# Patient Record
Sex: MALE | Race: WHITE
[De-identification: names, ages, dates, MRNs, and addresses within clinical notes are randomized per-mention and may not be internally consistent; named-entity substitution may affect disease eponyms.]

---

## 2019-08-22 ENCOUNTER — HOSPITAL ENCOUNTER (EMERGENCY)
Dept: HOSPITAL 6 - ED | Age: 24
LOS: 1 days | Discharge: HOME | End: 2019-08-23
Payer: COMMERCIAL

## 2019-08-22 VITALS — WEIGHT: 175.27 LBS | HEIGHT: 77.01 IN | BODY MASS INDEX: 20.69 KG/M2

## 2019-08-22 DIAGNOSIS — R19.7: ICD-10-CM

## 2019-08-22 DIAGNOSIS — E86.1: ICD-10-CM

## 2019-08-22 DIAGNOSIS — R11.2: ICD-10-CM

## 2019-08-22 DIAGNOSIS — Z90.49: ICD-10-CM

## 2019-08-22 DIAGNOSIS — E87.6: Primary | ICD-10-CM

## 2019-08-22 DIAGNOSIS — F32.9: ICD-10-CM

## 2019-08-22 DIAGNOSIS — R55: ICD-10-CM

## 2019-08-22 LAB
ALBUMIN SERPL-MCNC: 4.3 G/DL (ref 3.5–5)
ALT SERPL-CCNC: 58 U/L (ref 0–55)
APPEARANCE UR: (no result)
AST SERPL-CCNC: 78 U/L (ref 5–34)
BILIRUB SERPL-MCNC: 2.8 MG/DL (ref 0.2–1.2)
BILIRUB UR QL STRIP.AUTO: (no result)
CALCIUM SERPL-MCNC: 8.6 MG/DL (ref 8.3–10.5)
CALCIUM SERPL-MCNC: 9.9 MG/DL (ref 8.3–10.5)
CO2 SERPL-SCNC: 29 MMOL/L (ref 22–29)
CO2 SERPL-SCNC: 31 MMOL/L (ref 22–29)
COLOR UR AUTO: (no result)
ERYTHROCYTE [DISTWIDTH] IN BLOOD BY AUTOMATED COUNT: 11.8 % (ref 11.5–14.5)
ETHANOL SERPL-MCNC: < 10 MG/DL (ref ?–10)
GLUCOSE SERPL-MCNC: 145 MG/DL (ref 75–110)
GLUCOSE SERPL-MCNC: 163 MG/DL (ref 75–110)
GLUCOSE UR QL STRIP.AUTO: NEGATIVE MG/DL
HCT VFR BLD AUTO: 44.2 % (ref 42–52)
HGB BLD-MCNC: 16.2 G/DL (ref 13.5–18)
KETONES UR QL STRIP.AUTO: (no result)
LEUKOCYTE ESTERASE UR QL STRIP: NEGATIVE
LYMPHOCYTES NFR BLD MANUAL: 14 % (ref 20–51)
MCH RBC QN AUTO: 32 PG (ref 27–31)
MCHC RBC AUTO-ENTMCNC: 37 G/DL (ref 33–37)
MCV RBC AUTO: 88 FL (ref 78–100)
MONOCYTES NFR BLD: 11 % (ref 3–10)
MUCOUS THREADS URNS QL MICRO: PRESENT
NEUTS BAND NFR BLD: 1 % (ref 0–10)
NEUTS SEG NFR BLD MANUAL: 74 % (ref 42–75)
NITRITE UR QL STRIP: NEGATIVE
PH UR STRIP.AUTO: 5 [PH] (ref 5–8)
PLATELET # BLD AUTO: 119 K/MM3 (ref 130–400)
PMV BLD AUTO: 10.3 FL (ref 7.4–10.4)
POTASSIUM SERPL-SCNC: 2.6 MMOL/L (ref 3.5–5.1)
POTASSIUM SERPL-SCNC: 2.8 MMOL/L (ref 3.5–5.1)
PROT ?TM UR-MCNC: (no result) MG/DL
PROT SERPL-MCNC: 7.4 G/DL (ref 6.4–8.3)
RBC # BLD AUTO: 5.02 M/MM3 (ref 4.2–5.6)
RBC UR QL AUTO: (no result)
SODIUM SERPL-SCNC: 131 MMOL/L (ref 136–145)
SODIUM SERPL-SCNC: 134 MMOL/L (ref 136–145)
SP GR UR STRIP.AUTO: 1.03 (ref 1–1.03)
UROBILINOGEN UR-MCNC: NORMAL MG/DL
WBC # BLD AUTO: 11.7 K/MM3 (ref 4.8–10.8)
WBC #/AREA URNS HPF: (no result) /HPF (ref 0–3)

## 2019-08-23 VITALS — DIASTOLIC BLOOD PRESSURE: 95 MMHG | SYSTOLIC BLOOD PRESSURE: 143 MMHG

## 2019-09-04 ENCOUNTER — HOSPITAL ENCOUNTER (EMERGENCY)
Dept: HOSPITAL 6 - ED | Age: 24
LOS: 1 days | Discharge: ANOTHER HOSPITAL | End: 2019-09-05
Payer: COMMERCIAL

## 2019-09-04 DIAGNOSIS — R56.9: Primary | ICD-10-CM

## 2019-09-04 DIAGNOSIS — E87.6: ICD-10-CM

## 2019-09-04 DIAGNOSIS — Z88.0: ICD-10-CM

## 2019-09-04 DIAGNOSIS — Z90.49: ICD-10-CM

## 2019-09-04 LAB
ALBUMIN SERPL-MCNC: 3.9 G/DL (ref 3.5–5)
ALT SERPL-CCNC: 119 U/L (ref 0–55)
APPEARANCE UR: CLEAR
AST SERPL-CCNC: 94 U/L (ref 5–34)
BASOPHILS # BLD: 0 10*3/UL (ref 0.02–0.1)
BILIRUB SERPL-MCNC: 1.4 MG/DL (ref 0.2–1.2)
BILIRUB UR QL STRIP.AUTO: NEGATIVE
CALCIUM SERPL-MCNC: 9.3 MG/DL (ref 8.3–10.5)
CO2 SERPL-SCNC: 33 MMOL/L (ref 22–29)
COLOR UR AUTO: YELLOW
EOSINOPHIL # BLD: 0 10*3/UL (ref 0.04–0.4)
EOSINOPHIL NFR BLD: 0.1 % (ref 0–4)
ERYTHROCYTE [DISTWIDTH] IN BLOOD BY AUTOMATED COUNT: 12.1 % (ref 11.5–14.5)
ETHANOL SERPL-MCNC: < 10 MG/DL (ref ?–10)
GLUCOSE SERPL-MCNC: 99 MG/DL (ref 75–110)
GLUCOSE UR QL STRIP.AUTO: NEGATIVE MG/DL
HCT VFR BLD AUTO: 44.1 % (ref 42–52)
HGB BLD-MCNC: 15.5 G/DL (ref 13.5–18)
KETONES UR QL STRIP.AUTO: (no result)
LEUKOCYTE ESTERASE UR QL STRIP: NEGATIVE
LYMPHOCYTES # BLD: 1.2 10*3/UL (ref 1.5–4)
MCH RBC QN AUTO: 32 PG (ref 27–31)
MCHC RBC AUTO-ENTMCNC: 35 G/DL (ref 33–37)
MCV RBC AUTO: 90 FL (ref 78–100)
MONOCYTES # BLD: 0.5 10*3/UL (ref 0.2–0.8)
MUCOUS THREADS URNS QL MICRO: PRESENT
NEUTROPHILS # BLD: 5.7 10*3/UL (ref 1.4–6.5)
NITRITE UR QL STRIP: NEGATIVE
PH UR STRIP.AUTO: 7.5 [PH] (ref 5–8)
PLATELET # BLD AUTO: 158 K/MM3 (ref 130–400)
PMV BLD AUTO: 10.5 FL (ref 7.4–10.4)
POTASSIUM SERPL-SCNC: 2.7 MMOL/L (ref 3.5–5.1)
PROT ?TM UR-MCNC: (no result) MG/DL
PROT SERPL-MCNC: 6.7 G/DL (ref 6.4–8.3)
RBC # BLD AUTO: 4.92 M/MM3 (ref 4.2–5.6)
RBC UR QL AUTO: NEGATIVE
SODIUM SERPL-SCNC: 134 MMOL/L (ref 136–145)
SP GR UR STRIP.AUTO: 1.03 (ref 1–1.03)
UROBILINOGEN UR-MCNC: NORMAL MG/DL
WBC # BLD AUTO: 7.5 K/MM3 (ref 4.8–10.8)
WBC #/AREA URNS HPF: (no result) /HPF (ref 0–3)

## 2019-09-05 VITALS — DIASTOLIC BLOOD PRESSURE: 82 MMHG | SYSTOLIC BLOOD PRESSURE: 131 MMHG

## 2019-11-18 ENCOUNTER — HOSPITAL ENCOUNTER (EMERGENCY)
Dept: HOSPITAL 6 - ED | Age: 24
Discharge: HOME | End: 2019-11-18
Payer: COMMERCIAL

## 2019-11-18 VITALS — BODY MASS INDEX: 21.29 KG/M2 | WEIGHT: 180.34 LBS | HEIGHT: 77.01 IN

## 2019-11-18 VITALS — DIASTOLIC BLOOD PRESSURE: 91 MMHG | SYSTOLIC BLOOD PRESSURE: 148 MMHG

## 2019-11-18 DIAGNOSIS — G40.909: Primary | ICD-10-CM

## 2019-11-18 DIAGNOSIS — Z88.1: ICD-10-CM

## 2020-04-23 ENCOUNTER — HOSPITAL ENCOUNTER (EMERGENCY)
Dept: HOSPITAL 6 - ED | Age: 25
LOS: 1 days | Discharge: ANOTHER HOSPITAL | End: 2020-04-24
Payer: COMMERCIAL

## 2020-04-23 DIAGNOSIS — R10.13: ICD-10-CM

## 2020-04-23 DIAGNOSIS — F10.239: Primary | ICD-10-CM

## 2020-04-23 DIAGNOSIS — F19.239: ICD-10-CM

## 2020-04-23 DIAGNOSIS — Z90.89: ICD-10-CM

## 2020-04-23 DIAGNOSIS — F32.9: ICD-10-CM

## 2020-04-23 DIAGNOSIS — Z88.0: ICD-10-CM

## 2020-04-24 ENCOUNTER — HOSPITAL ENCOUNTER (INPATIENT)
Dept: HOSPITAL 19 - MEDICAL | Age: 25
LOS: 1 days | Discharge: HOME | DRG: 897 | End: 2020-04-25
Attending: HOSPITALIST | Admitting: HOSPITALIST
Payer: COMMERCIAL

## 2020-04-24 VITALS — DIASTOLIC BLOOD PRESSURE: 77 MMHG | SYSTOLIC BLOOD PRESSURE: 137 MMHG | HEART RATE: 87 BPM | TEMPERATURE: 97.9 F

## 2020-04-24 VITALS — TEMPERATURE: 98 F | DIASTOLIC BLOOD PRESSURE: 82 MMHG | SYSTOLIC BLOOD PRESSURE: 137 MMHG | HEART RATE: 66 BPM

## 2020-04-24 VITALS — HEART RATE: 66 BPM | SYSTOLIC BLOOD PRESSURE: 136 MMHG | TEMPERATURE: 97.7 F | DIASTOLIC BLOOD PRESSURE: 87 MMHG

## 2020-04-24 VITALS — DIASTOLIC BLOOD PRESSURE: 76 MMHG | HEART RATE: 63 BPM | TEMPERATURE: 97.7 F | SYSTOLIC BLOOD PRESSURE: 138 MMHG

## 2020-04-24 VITALS — DIASTOLIC BLOOD PRESSURE: 72 MMHG | HEART RATE: 65 BPM | TEMPERATURE: 98.2 F | SYSTOLIC BLOOD PRESSURE: 132 MMHG

## 2020-04-24 VITALS — HEART RATE: 82 BPM | DIASTOLIC BLOOD PRESSURE: 83 MMHG | TEMPERATURE: 98.9 F | SYSTOLIC BLOOD PRESSURE: 136 MMHG

## 2020-04-24 VITALS — TEMPERATURE: 98.3 F | DIASTOLIC BLOOD PRESSURE: 73 MMHG | SYSTOLIC BLOOD PRESSURE: 136 MMHG | HEART RATE: 69 BPM

## 2020-04-24 VITALS — SYSTOLIC BLOOD PRESSURE: 126 MMHG | TEMPERATURE: 98.6 F | DIASTOLIC BLOOD PRESSURE: 71 MMHG | HEART RATE: 69 BPM

## 2020-04-24 VITALS — WEIGHT: 190.7 LBS | BODY MASS INDEX: 22.52 KG/M2 | HEIGHT: 77.01 IN

## 2020-04-24 VITALS — SYSTOLIC BLOOD PRESSURE: 125 MMHG | DIASTOLIC BLOOD PRESSURE: 80 MMHG

## 2020-04-24 VITALS — SYSTOLIC BLOOD PRESSURE: 126 MMHG | HEART RATE: 77 BPM | DIASTOLIC BLOOD PRESSURE: 78 MMHG | TEMPERATURE: 97.4 F

## 2020-04-24 DIAGNOSIS — E83.42: ICD-10-CM

## 2020-04-24 DIAGNOSIS — R73.9: ICD-10-CM

## 2020-04-24 DIAGNOSIS — Z88.0: ICD-10-CM

## 2020-04-24 DIAGNOSIS — F32.9: ICD-10-CM

## 2020-04-24 DIAGNOSIS — F10.239: Primary | ICD-10-CM

## 2020-04-24 DIAGNOSIS — E87.3: ICD-10-CM

## 2020-04-24 DIAGNOSIS — F41.9: ICD-10-CM

## 2020-04-24 DIAGNOSIS — E87.6: ICD-10-CM

## 2020-04-24 LAB
ALBUMIN SERPL-MCNC: 4.1 GM/DL (ref 3.5–5)
ALBUMIN SERPL-MCNC: 5.2 G/DL (ref 3.5–5)
ALP SERPL-CCNC: 65 U/L (ref 50–136)
ALT SERPL-CCNC: 70 U/L (ref 4–49)
ALT SERPL-CCNC: 93 U/L (ref 0–55)
ANION GAP SERPL CALC-SCNC: 8 MMOL/L (ref 7–16)
APPEARANCE UR: (no result)
APTT PPP: 19.8 SECONDS (ref 21–32)
AST SERPL-CCNC: 54 U/L (ref 15–37)
AST SERPL-CCNC: 59 U/L (ref 5–34)
BASOPHILS # BLD: 0 10*3/UL (ref 0.02–0.1)
BASOPHILS # BLD: 0 10*3/UL (ref 0–0.2)
BASOPHILS NFR BLD AUTO: 0.5 % (ref 0–2)
BILIRUB DIRECT SERPL-MCNC: 0.7 MG/DL (ref 0–0.5)
BILIRUB SERPL-MCNC: 1.6 MG/DL (ref 0.2–1.2)
BILIRUB SERPL-MCNC: 1.6 MG/DL (ref 0.2–1.2)
BILIRUB SERPL-MCNC: 1.8 MG/DL (ref 0–1)
BILIRUB UR QL STRIP.AUTO: (no result)
BUN SERPL-MCNC: 20 MG/DL (ref 9–20)
CALCIUM SERPL-MCNC: 10.7 MG/DL (ref 8.3–10.5)
CALCIUM SERPL-MCNC: 9.2 MG/DL (ref 8.4–10.2)
CHLORIDE SERPL-SCNC: 95 MMOL/L (ref 98–107)
CO2 SERPL-SCNC: 32 MMOL/L (ref 22–29)
CO2 SERPL-SCNC: 33 MMOL/L (ref 22–30)
COLOR UR AUTO: (no result)
CREAT SERPL-SCNC: 0.73 UMOL/L (ref 0.66–1.25)
EOSINOPHIL # BLD: 0 10*3/UL (ref 0.04–0.4)
EOSINOPHIL # BLD: 0.1 10*3/UL (ref 0–0.7)
EOSINOPHIL NFR BLD: 0 % (ref 0–4)
EOSINOPHIL NFR BLD: 1.9 % (ref 0–4)
ERYTHROCYTE [DISTWIDTH] IN BLOOD BY AUTOMATED COUNT: 11.9 % (ref 11.5–14.5)
ERYTHROCYTE [DISTWIDTH] IN BLOOD BY AUTOMATED COUNT: 12.7 % (ref 11.5–14.5)
ETHANOL SERPL-MCNC: < 10 MG/DL (ref ?–10)
GLUCOSE SERPL-MCNC: 112 MG/DL (ref 74–106)
GLUCOSE SERPL-MCNC: 163 MG/DL (ref 75–110)
GLUCOSE UR QL STRIP.AUTO: NEGATIVE MG/DL
GRANULOCYTES # BLD AUTO: 61.2 % (ref 42.2–75.2)
HCT VFR BLD AUTO: 39.1 % (ref 42–52)
HCT VFR BLD AUTO: 47.4 % (ref 42–52)
HGB BLD-MCNC: 13.4 G/DL (ref 13.5–18)
HGB BLD-MCNC: 16.2 G/DL (ref 13.5–18)
KETONES UR QL STRIP.AUTO: (no result)
LEUKOCYTE ESTERASE UR QL STRIP: NEGATIVE
LIPASE SERPL-CCNC: 6 U/L (ref 8–78)
LYMPHOCYTES # BLD: 1.8 10*3/UL (ref 1.5–4)
LYMPHOCYTES # BLD: 2 10*3/UL (ref 1.2–3.4)
LYMPHOCYTES NFR BLD: 26.2 % (ref 20–51)
MAGNESIUM SERPL-MCNC: 1.59 MG/DL (ref 1.6–2.6)
MAGNESIUM SERPL-MCNC: 1.7 MG/DL (ref 1.6–2.3)
MCH RBC QN AUTO: 33 PG (ref 27–31)
MCH RBC QN AUTO: 33 PG (ref 27–31)
MCHC RBC AUTO-ENTMCNC: 34 G/DL (ref 33–37)
MCHC RBC AUTO-ENTMCNC: 34 G/DL (ref 33–37)
MCV RBC AUTO: 95 FL (ref 78–100)
MCV RBC AUTO: 95 FL (ref 80–100)
MONOCYTES # BLD: 0.8 10*3/UL (ref 0.1–0.6)
MONOCYTES # BLD: 1.3 10*3/UL (ref 0.2–0.8)
MONOCYTES NFR BLD AUTO: 10.1 % (ref 1.7–9.3)
MUCOUS THREADS URNS QL MICRO: PRESENT
NEUTROPHILS # BLD: 10.5 10*3/UL (ref 1.4–6.5)
NEUTROPHILS # BLD: 4.6 10*3/UL (ref 1.4–6.5)
NITRITE UR QL STRIP: NEGATIVE
PH UR STRIP.AUTO: 8 [PH] (ref 5–8)
PLATELET # BLD AUTO: 226 K/MM3 (ref 130–400)
PLATELET # BLD AUTO: 349 K/MM3 (ref 130–400)
PMV BLD AUTO: 9.8 FL (ref 7.4–10.4)
PMV BLD AUTO: 9.8 FL (ref 7.4–10.4)
POTASSIUM SERPL-SCNC: 2.9 MMOL/L (ref 3.4–5)
POTASSIUM SERPL-SCNC: 3.4 MMOL/L (ref 3.5–5.1)
PROT ?TM UR-MCNC: (no result) MG/DL
PROT SERPL-MCNC: 6.6 GM/DL (ref 6.4–8.2)
PROT SERPL-MCNC: 8 G/DL (ref 6.4–8.3)
PROTHROMBIN TIME: 10.9 SECONDS (ref 9–12)
RBC # BLD AUTO: 4.1 M/MM3 (ref 4.2–5.6)
RBC # BLD AUTO: 4.98 M/MM3 (ref 4.2–5.6)
RBC UR QL AUTO: NEGATIVE
SODIUM SERPL-SCNC: 136 MMOL/L (ref 137–145)
SODIUM SERPL-SCNC: 141 MMOL/L (ref 136–145)
SP GR UR STRIP.AUTO: 1.04 (ref 1–1.03)
UROBILINOGEN UR-MCNC: NORMAL MG/DL
WBC # BLD AUTO: 13.6 K/MM3 (ref 4.8–10.8)
WBC #/AREA URNS HPF: (no result) /HPF (ref 0–3)

## 2020-04-24 PROCEDURE — C9113 INJ PANTOPRAZOLE SODIUM, VIA: HCPCS

## 2020-04-24 PROCEDURE — A4216 STERILE WATER/SALINE, 10 ML: HCPCS

## 2020-04-24 NOTE — NUR
Pt has scored a zero on every ETOH assessment thus far. He denies
nausea/vomiting and states he is feeling a little better as of right now. he
is aware of his POC. Requested an increase in his diet, will speak with PA to
request. No other needs. Call light is in reach.

## 2020-04-24 NOTE — NUR
Initial shift assessment done- denies pain,nausea- no tremors noted- eating
without nausea--states wants to go home tomorrow, Tele on, VSS-- on detox
protocol- scores 0,, on potassium protocol-will repeat potassium at 2100.

## 2020-04-24 NOTE — NUR
PATIENT RESTING IN BED DURING REPORT GIVEN TO DAY SHIFT NURSE WITH NO CURRENT
C/O OR NEEDS. TELE CONTINUES.

## 2020-04-24 NOTE — NUR
Assessment complete. Patient resting in bed, awakes easily to voice upon
entry. He is aware of his POC. His scores have been zeros this morning. No
visible tremors or active nausea or vomiting. IV site is CD&I, fluids and
magnesium running at this time. He denies pain or discomfort of any kind.
States he is just groggy. Informed him of the high fall risk precautions that
are in place, including the bed alarm. No other needs were expressed at this
time. Call light is in reach.

## 2020-04-24 NOTE — NUR
met with patient to discuss discharge planning. Patient lives
with his wife, Leila (ph#[phone]) and nine month old daughter. Patient sees
SAIGE Beard at [facility] for primary care and obtains
medications from [facility] with no difficulties. Patient is
independent with ADLS. SW addressed alcohol use with patient. Patient denies
any history of inpatient treatment. Patient expressed interest in outpatient
services. SW provided resources which included information for [facility], [facility], and local AA meetings. SW offered to make an
appointment for patient at [facility] for evaluation but patient advised he wants to
look these resources over and follow up on his own. Patient plans to return
home upon discharge. SW to continue to follow.

## 2020-04-24 NOTE — NUR
Pt had no other issues through the day. He has no withdrawl symptoms and
states that he feels better. He ate all of his dinner and requested more food.
Fluids continue to run @125ml/hr. IV site CD&I. No other needs. Call light in
reach.

## 2020-04-24 NOTE — NUR
Pt continues to remain stable with out needs. No visible tremors, pt has had
no nausea or vomiting and his diet has been increased to general. He was
hungry so I provided him with a sandwich box, he has tolerated that well since
consumption. He stated his throat was a little bit sore. While Dr. Martinez was in
she did state that his CT showed some esophogeal inflammation which was likely
from the vomiting he was experiencing prior to admission. I offered tylenol or
possible antistetic throat spray. he stated it is not that bad. I told him to
inform me if it worsens and he feels like he needs something. No other needs
at this time. Call light in reach.

## 2020-04-25 VITALS — SYSTOLIC BLOOD PRESSURE: 123 MMHG | TEMPERATURE: 98 F | DIASTOLIC BLOOD PRESSURE: 82 MMHG | HEART RATE: 53 BPM

## 2020-04-25 VITALS — DIASTOLIC BLOOD PRESSURE: 78 MMHG | SYSTOLIC BLOOD PRESSURE: 122 MMHG | TEMPERATURE: 97.8 F | HEART RATE: 65 BPM

## 2020-04-25 VITALS — HEART RATE: 60 BPM | DIASTOLIC BLOOD PRESSURE: 87 MMHG | SYSTOLIC BLOOD PRESSURE: 134 MMHG | TEMPERATURE: 97.3 F

## 2020-04-25 VITALS — DIASTOLIC BLOOD PRESSURE: 96 MMHG | TEMPERATURE: 98.1 F | HEART RATE: 78 BPM | SYSTOLIC BLOOD PRESSURE: 130 MMHG

## 2020-04-25 VITALS — HEART RATE: 65 BPM | TEMPERATURE: 97.8 F | DIASTOLIC BLOOD PRESSURE: 79 MMHG | SYSTOLIC BLOOD PRESSURE: 123 MMHG

## 2020-04-25 LAB
ALBUMIN SERPL-MCNC: 3.5 GM/DL (ref 3.5–5)
ALP SERPL-CCNC: 54 U/L (ref 50–136)
ALT SERPL-CCNC: 55 U/L (ref 4–49)
ANION GAP SERPL CALC-SCNC: 4 MMOL/L (ref 7–16)
AST SERPL-CCNC: 40 U/L (ref 15–37)
BASOPHILS # BLD: 0 10*3/UL (ref 0–0.2)
BASOPHILS NFR BLD AUTO: 0.5 % (ref 0–2)
BILIRUB SERPL-MCNC: 0.8 MG/DL (ref 0–1)
BUN SERPL-MCNC: 10 MG/DL (ref 9–20)
CALCIUM SERPL-MCNC: 8.8 MG/DL (ref 8.4–10.2)
CHLORIDE SERPL-SCNC: 105 MMOL/L (ref 98–107)
CO2 SERPL-SCNC: 27 MMOL/L (ref 22–30)
CREAT SERPL-SCNC: 0.68 UMOL/L (ref 0.66–1.25)
EOSINOPHIL # BLD: 0.4 10*3/UL (ref 0–0.7)
EOSINOPHIL NFR BLD: 8 % (ref 0–4)
ERYTHROCYTE [DISTWIDTH] IN BLOOD BY AUTOMATED COUNT: 11.9 % (ref 11.5–14.5)
GLUCOSE SERPL-MCNC: 99 MG/DL (ref 74–106)
GRANULOCYTES # BLD AUTO: 44.9 % (ref 42.2–75.2)
HCT VFR BLD AUTO: 38.2 % (ref 42–52)
HGB BLD-MCNC: 12.9 G/DL (ref 13.5–18)
LYMPHOCYTES # BLD: 2.2 10*3/UL (ref 1.2–3.4)
LYMPHOCYTES NFR BLD: 40.7 % (ref 20–51)
MAGNESIUM SERPL-MCNC: 2 MG/DL (ref 1.6–2.3)
MCH RBC QN AUTO: 33 PG (ref 27–31)
MCHC RBC AUTO-ENTMCNC: 34 G/DL (ref 33–37)
MCV RBC AUTO: 98 FL (ref 80–100)
MONOCYTES # BLD: 0.3 10*3/UL (ref 0.1–0.6)
MONOCYTES NFR BLD AUTO: 5.5 % (ref 1.7–9.3)
NEUTROPHILS # BLD: 2.5 10*3/UL (ref 1.4–6.5)
PLATELET # BLD AUTO: 180 K/MM3 (ref 130–400)
PMV BLD AUTO: 9.9 FL (ref 7.4–10.4)
POTASSIUM SERPL-SCNC: 3.8 MMOL/L (ref 3.4–5)
PROT SERPL-MCNC: 5.9 GM/DL (ref 6.4–8.2)
RBC # BLD AUTO: 3.89 M/MM3 (ref 4.2–5.6)
SODIUM SERPL-SCNC: 137 MMOL/L (ref 137–145)

## 2020-04-25 NOTE — NUR
Discharge instructions reviewed with patient, verbalized understanding.
Discharged ambulatory to auto/home at 1120.

## 2020-04-25 NOTE — NUR
Patient alert and oriented, answers questions appropriately.  See assessment.
CIWA score remains zero.  No tremors/hallucinations/slurring of speech.
Patient voices his lapse in alcohol consumption.  No c/o pain or discomfort.

## 2020-05-07 ENCOUNTER — HOSPITAL ENCOUNTER (EMERGENCY)
Dept: HOSPITAL 19 - COL.ER | Age: 25
Discharge: HOME | End: 2020-05-07
Payer: COMMERCIAL

## 2020-05-07 VITALS — HEIGHT: 77.01 IN | BODY MASS INDEX: 23.66 KG/M2 | WEIGHT: 200.4 LBS

## 2020-05-07 VITALS — DIASTOLIC BLOOD PRESSURE: 98 MMHG | HEART RATE: 98 BPM | SYSTOLIC BLOOD PRESSURE: 150 MMHG

## 2020-05-07 VITALS — TEMPERATURE: 97.4 F

## 2020-05-07 DIAGNOSIS — F41.9: ICD-10-CM
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
## 2020-05-07 DIAGNOSIS — F32.9: ICD-10-CM

## 2020-05-07 DIAGNOSIS — F10.229: ICD-10-CM
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
## 2020-05-07 DIAGNOSIS — S49.92XA: Primary | ICD-10-CM

## 2020-05-07 LAB
ALBUMIN SERPL-MCNC: 4.6 GM/DL (ref 3.5–5)
ALP SERPL-CCNC: 93 U/L (ref 50–136)
ALT SERPL-CCNC: 55 U/L (ref 4–49)
ANION GAP SERPL CALC-SCNC: 12 MMOL/L (ref 7–16)
AST SERPL-CCNC: 48 U/L (ref 15–37)
BASOPHILS # BLD: 0 10*3/UL (ref 0–0.2)
BASOPHILS NFR BLD AUTO: 0.7 % (ref 0–2)
BILIRUB SERPL-MCNC: 0.4 MG/DL (ref 0–1)
BUN SERPL-MCNC: 10 MG/DL (ref 9–20)
CALCIUM SERPL-MCNC: 9.7 MG/DL (ref 8.4–10.2)
CHLORIDE SERPL-SCNC: 102 MMOL/L (ref 98–107)
CO2 SERPL-SCNC: 28 MMOL/L (ref 22–30)
CREAT SERPL-SCNC: 0.77 UMOL/L (ref 0.66–1.25)
EOSINOPHIL # BLD: 0 10*3/UL (ref 0–0.7)
EOSINOPHIL NFR BLD: 0.2 % (ref 0–4)
ERYTHROCYTE [DISTWIDTH] IN BLOOD BY AUTOMATED COUNT: 11.3 % (ref 11.5–14.5)
ETHANOL SPEC-SCNC: 293 MG/DL
GLUCOSE SERPL-MCNC: 168 MG/DL (ref 74–106)
GRANULOCYTES # BLD AUTO: 45.7 % (ref 42.2–75.2)
HCT VFR BLD AUTO: 48.2 % (ref 42–52)
HGB BLD-MCNC: 16.2 G/DL (ref 13.5–18)
LIPASE SERPL-CCNC: 66 U/L (ref 23–300)
LYMPHOCYTES # BLD: 2.6 10*3/UL (ref 1.2–3.4)
LYMPHOCYTES NFR BLD: 46.8 % (ref 20–51)
MAGNESIUM SERPL-MCNC: 2 MG/DL (ref 1.6–2.3)
MCH RBC QN AUTO: 32 PG (ref 27–31)
MCHC RBC AUTO-ENTMCNC: 34 G/DL (ref 33–37)
MCV RBC AUTO: 94 FL (ref 80–100)
MONOCYTES # BLD: 0.3 10*3/UL (ref 0.1–0.6)
MONOCYTES NFR BLD AUTO: 6.2 % (ref 1.7–9.3)
NEUTROPHILS # BLD: 2.5 10*3/UL (ref 1.4–6.5)
PHOSPHATE SERPL-MCNC: 3.5 MG/DL (ref 2.5–4.5)
PLATELET # BLD AUTO: 317 K/MM3 (ref 130–400)
PMV BLD AUTO: 8.9 FL (ref 7.4–10.4)
POTASSIUM SERPL-SCNC: 3.8 MMOL/L (ref 3.4–5)
PROT SERPL-MCNC: 7.7 GM/DL (ref 6.4–8.2)
RBC # BLD AUTO: 5.11 M/MM3 (ref 4.2–5.6)
SODIUM SERPL-SCNC: 143 MMOL/L (ref 137–145)

## 2020-05-08 ENCOUNTER — HOSPITAL ENCOUNTER (EMERGENCY)
Dept: HOSPITAL 6 - ED | Age: 25
Discharge: HOME | End: 2020-05-08
Payer: COMMERCIAL

## 2020-05-08 VITALS — SYSTOLIC BLOOD PRESSURE: 148 MMHG | DIASTOLIC BLOOD PRESSURE: 95 MMHG

## 2020-05-08 VITALS — HEIGHT: 77.01 IN | BODY MASS INDEX: 24.26 KG/M2 | WEIGHT: 205.47 LBS

## 2020-05-08 DIAGNOSIS — F10.220: Primary | ICD-10-CM

## 2020-05-08 DIAGNOSIS — M25.512: ICD-10-CM

## 2020-05-08 DIAGNOSIS — F32.9: ICD-10-CM

## 2020-05-08 LAB
ALBUMIN SERPL-MCNC: 4.5 G/DL (ref 3.5–5)
ALT SERPL-CCNC: 57 U/L (ref 0–55)
AST SERPL-CCNC: 45 U/L (ref 5–34)
BASOPHILS # BLD: 0 10*3/UL (ref 0.02–0.1)
BILIRUB SERPL-MCNC: 0.5 MG/DL (ref 0.2–1.2)
CALCIUM SERPL-MCNC: 9.8 MG/DL (ref 8.3–10.5)
CO2 SERPL-SCNC: 26 MMOL/L (ref 22–29)
EOSINOPHIL # BLD: 0 10*3/UL (ref 0.04–0.4)
EOSINOPHIL NFR BLD: 0.2 % (ref 0–4)
ERYTHROCYTE [DISTWIDTH] IN BLOOD BY AUTOMATED COUNT: 11.9 % (ref 11.5–14.5)
ETHANOL SERPL-MCNC: 333 MG/DL (ref ?–10)
GLUCOSE SERPL-MCNC: 116 MG/DL (ref 75–110)
HCT VFR BLD AUTO: 49.2 % (ref 42–52)
HGB BLD-MCNC: 16.4 G/DL (ref 13.5–18)
LYMPHOCYTES # BLD: 2.2 10*3/UL (ref 1.5–4)
MCH RBC QN AUTO: 31 PG (ref 27–31)
MCHC RBC AUTO-ENTMCNC: 33 G/DL (ref 33–37)
MCV RBC AUTO: 93 FL (ref 78–100)
MONOCYTES # BLD: 0.3 10*3/UL (ref 0.2–0.8)
NEUTROPHILS # BLD: 2.3 10*3/UL (ref 1.4–6.5)
PLATELET # BLD AUTO: 315 K/MM3 (ref 130–400)
PMV BLD AUTO: 8.8 FL (ref 7.4–10.4)
POTASSIUM SERPL-SCNC: 3.5 MMOL/L (ref 3.5–5.1)
PROT SERPL-MCNC: 7.7 G/DL (ref 6.4–8.3)
RBC # BLD AUTO: 5.27 M/MM3 (ref 4.2–5.6)
SODIUM SERPL-SCNC: 145 MMOL/L (ref 136–145)
WBC # BLD AUTO: 4.8 K/MM3 (ref 4.8–10.8)

## 2020-05-09 ENCOUNTER — HOSPITAL ENCOUNTER (EMERGENCY)
Dept: HOSPITAL 6 - ED | Age: 25
Discharge: HOME | End: 2020-05-09
Payer: COMMERCIAL

## 2020-05-09 VITALS — HEIGHT: 77.01 IN | WEIGHT: 195.33 LBS | BODY MASS INDEX: 23.06 KG/M2

## 2020-05-09 VITALS — SYSTOLIC BLOOD PRESSURE: 151 MMHG | DIASTOLIC BLOOD PRESSURE: 95 MMHG

## 2020-05-09 DIAGNOSIS — F41.9: ICD-10-CM
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
## 2020-05-09 DIAGNOSIS — S49.92XA: Primary | ICD-10-CM

## 2020-05-09 DIAGNOSIS — F10.220: ICD-10-CM
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
## 2020-05-09 LAB
ETHANOL SERPL-MCNC: 58 MG/DL (ref ?–10)
MAGNESIUM SERPL-MCNC: 1.75 MG/DL (ref 1.6–2.6)

## 2020-08-03 ENCOUNTER — HOSPITAL ENCOUNTER (EMERGENCY)
Dept: HOSPITAL 6 - ED | Age: 25
LOS: 1 days | Discharge: HOME | End: 2020-08-04
Payer: COMMERCIAL

## 2020-08-03 VITALS — WEIGHT: 215.39 LBS | BODY MASS INDEX: 25.43 KG/M2 | HEIGHT: 77.01 IN

## 2020-08-03 DIAGNOSIS — F10.229: Primary | ICD-10-CM

## 2020-08-03 DIAGNOSIS — F32.9: ICD-10-CM

## 2020-08-03 DIAGNOSIS — F41.9: ICD-10-CM

## 2020-08-04 ENCOUNTER — HOSPITAL ENCOUNTER (EMERGENCY)
Dept: HOSPITAL 6 - ED | Age: 25
Discharge: HOME | End: 2020-08-04
Payer: COMMERCIAL

## 2020-08-04 ENCOUNTER — HOSPITAL ENCOUNTER (EMERGENCY)
Dept: HOSPITAL 6 - ED | Age: 25
Discharge: LEFT BEFORE DISCHARGE | End: 2020-08-04
Payer: COMMERCIAL

## 2020-08-04 VITALS — SYSTOLIC BLOOD PRESSURE: 132 MMHG | DIASTOLIC BLOOD PRESSURE: 98 MMHG

## 2020-08-04 VITALS — SYSTOLIC BLOOD PRESSURE: 151 MMHG | DIASTOLIC BLOOD PRESSURE: 92 MMHG

## 2020-08-04 VITALS — SYSTOLIC BLOOD PRESSURE: 151 MMHG | DIASTOLIC BLOOD PRESSURE: 93 MMHG

## 2020-08-04 VITALS — HEIGHT: 77.01 IN | WEIGHT: 201.5 LBS | BODY MASS INDEX: 23.79 KG/M2

## 2020-08-04 DIAGNOSIS — F10.129: Primary | ICD-10-CM

## 2020-08-04 DIAGNOSIS — F41.9: ICD-10-CM

## 2020-08-04 DIAGNOSIS — R69: Primary | ICD-10-CM

## 2020-08-04 DIAGNOSIS — F19.10: ICD-10-CM

## 2020-08-04 DIAGNOSIS — F32.9: ICD-10-CM

## 2020-08-04 LAB
ALBUMIN SERPL-MCNC: 4.8 G/DL (ref 3.5–5)
ALBUMIN SERPL-MCNC: 4.8 G/DL (ref 3.5–5)
ALT SERPL-CCNC: 44 U/L (ref 0–55)
ALT SERPL-CCNC: 69 U/L (ref 0–55)
APAP SERPL-MCNC: < 1 UG/ML
AST SERPL-CCNC: 29 U/L (ref 5–34)
AST SERPL-CCNC: 56 U/L (ref 5–34)
BASOPHILS # BLD: 0 10*3/UL (ref 0.02–0.1)
BASOPHILS # BLD: 0 10*3/UL (ref 0.02–0.1)
BILIRUB SERPL-MCNC: 0.8 MG/DL (ref 0.2–1.2)
BILIRUB SERPL-MCNC: 0.9 MG/DL (ref 0.2–1.2)
CALCIUM SERPL-MCNC: 9.6 MG/DL (ref 8.3–10.5)
CALCIUM SERPL-MCNC: 9.7 MG/DL (ref 8.3–10.5)
CO2 SERPL-SCNC: 24 MMOL/L (ref 22–29)
CO2 SERPL-SCNC: 25 MMOL/L (ref 22–29)
EOSINOPHIL # BLD: 0 10*3/UL (ref 0.04–0.4)
EOSINOPHIL # BLD: 0 10*3/UL (ref 0.04–0.4)
EOSINOPHIL NFR BLD: 0.1 % (ref 0–4)
EOSINOPHIL NFR BLD: 0.1 % (ref 0–4)
ERYTHROCYTE [DISTWIDTH] IN BLOOD BY AUTOMATED COUNT: 13.2 % (ref 11.5–14.5)
ERYTHROCYTE [DISTWIDTH] IN BLOOD BY AUTOMATED COUNT: 13.2 % (ref 11.5–14.5)
ETHANOL SERPL-MCNC: 280 MG/DL (ref ?–10)
ETHANOL SERPL-MCNC: 304 MG/DL (ref ?–10)
GASTROCULT GAST QL: NEGATIVE
GLUCOSE SERPL-MCNC: 131 MG/DL (ref 75–110)
GLUCOSE SERPL-MCNC: 143 MG/DL (ref 75–110)
HCT VFR BLD AUTO: 48.1 % (ref 42–52)
HCT VFR BLD AUTO: 48.8 % (ref 42–52)
HGB BLD-MCNC: 16.2 G/DL (ref 13.5–18)
HGB BLD-MCNC: 16.4 G/DL (ref 13.5–18)
LIPASE SERPL-CCNC: 26 U/L (ref 8–78)
LYMPHOCYTES # BLD: 2.1 10*3/UL (ref 1.5–4)
LYMPHOCYTES # BLD: 2.4 10*3/UL (ref 1.5–4)
MCH RBC QN AUTO: 29 PG (ref 27–31)
MCH RBC QN AUTO: 30 PG (ref 27–31)
MCHC RBC AUTO-ENTMCNC: 33 G/DL (ref 33–37)
MCHC RBC AUTO-ENTMCNC: 34 G/DL (ref 33–37)
MCV RBC AUTO: 87 FL (ref 78–100)
MCV RBC AUTO: 88 FL (ref 78–100)
MONOCYTES # BLD: 0.5 10*3/UL (ref 0.2–0.8)
MONOCYTES # BLD: 0.7 10*3/UL (ref 0.2–0.8)
NEUTROPHILS # BLD: 4.4 10*3/UL (ref 1.4–6.5)
NEUTROPHILS # BLD: 4.6 10*3/UL (ref 1.4–6.5)
PLATELET # BLD AUTO: 284 K/MM3 (ref 130–400)
PLATELET # BLD AUTO: 287 K/MM3 (ref 130–400)
PMV BLD AUTO: 9.4 FL (ref 7.4–10.4)
PMV BLD AUTO: 9.4 FL (ref 7.4–10.4)
POTASSIUM SERPL-SCNC: 3.3 MMOL/L (ref 3.5–5.1)
POTASSIUM SERPL-SCNC: 3.4 MMOL/L (ref 3.5–5.1)
PROT SERPL-MCNC: 7.9 G/DL (ref 6.4–8.3)
PROT SERPL-MCNC: 7.9 G/DL (ref 6.4–8.3)
RBC # BLD AUTO: 5.52 M/MM3 (ref 4.2–5.6)
RBC # BLD AUTO: 5.56 M/MM3 (ref 4.2–5.6)
SODIUM SERPL-SCNC: 145 MMOL/L (ref 136–145)
SODIUM SERPL-SCNC: 147 MMOL/L (ref 136–145)
WBC # BLD AUTO: 7 K/MM3 (ref 4.8–10.8)
WBC # BLD AUTO: 7.7 K/MM3 (ref 4.8–10.8)

## 2020-08-06 ENCOUNTER — HOSPITAL ENCOUNTER (EMERGENCY)
Dept: HOSPITAL 6 - ED | Age: 25
Discharge: LEFT BEFORE DISCHARGE | End: 2020-08-06
Payer: COMMERCIAL

## 2020-08-06 VITALS — BODY MASS INDEX: 27.29 KG/M2 | HEIGHT: 72.01 IN | WEIGHT: 201.5 LBS

## 2020-08-06 VITALS — DIASTOLIC BLOOD PRESSURE: 102 MMHG | SYSTOLIC BLOOD PRESSURE: 144 MMHG

## 2020-08-06 DIAGNOSIS — F32.9: ICD-10-CM

## 2020-08-06 DIAGNOSIS — F41.9: ICD-10-CM
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
## 2020-08-06 DIAGNOSIS — Z90.89: ICD-10-CM

## 2020-08-06 DIAGNOSIS — S09.90XA: Primary | ICD-10-CM

## 2020-08-06 DIAGNOSIS — F10.129: ICD-10-CM

## 2020-08-06 LAB
ALBUMIN SERPL-MCNC: 4.6 G/DL (ref 3.5–5)
ALT SERPL-CCNC: 62 U/L (ref 0–55)
AST SERPL-CCNC: 41 U/L (ref 5–34)
BASOPHILS # BLD: 0 10*3/UL (ref 0.02–0.1)
BILIRUB SERPL-MCNC: 1 MG/DL (ref 0.2–1.2)
CALCIUM SERPL-MCNC: 9.1 MG/DL (ref 8.3–10.5)
CO2 SERPL-SCNC: 25 MMOL/L (ref 22–29)
EOSINOPHIL # BLD: 0 10*3/UL (ref 0.04–0.4)
EOSINOPHIL NFR BLD: 0 % (ref 0–4)
ERYTHROCYTE [DISTWIDTH] IN BLOOD BY AUTOMATED COUNT: 12.9 % (ref 11.5–14.5)
ETHANOL SERPL-MCNC: 333 MG/DL (ref ?–10)
GLUCOSE SERPL-MCNC: 159 MG/DL (ref 75–110)
HCT VFR BLD AUTO: 48.2 % (ref 42–52)
HGB BLD-MCNC: 16.7 G/DL (ref 13.5–18)
LYMPHOCYTES # BLD: 2.8 10*3/UL (ref 1.5–4)
MCH RBC QN AUTO: 30 PG (ref 27–31)
MCHC RBC AUTO-ENTMCNC: 35 G/DL (ref 33–37)
MCV RBC AUTO: 86 FL (ref 78–100)
MONOCYTES # BLD: 0.7 10*3/UL (ref 0.2–0.8)
NEUTROPHILS # BLD: 5.4 10*3/UL (ref 1.4–6.5)
PLATELET # BLD AUTO: 311 K/MM3 (ref 130–400)
PMV BLD AUTO: 9.7 FL (ref 7.4–10.4)
POTASSIUM SERPL-SCNC: 3.1 MMOL/L (ref 3.5–5.1)
PROT SERPL-MCNC: 7.6 G/DL (ref 6.4–8.3)
RBC # BLD AUTO: 5.59 M/MM3 (ref 4.2–5.6)
SODIUM SERPL-SCNC: 144 MMOL/L (ref 136–145)
WBC # BLD AUTO: 9 K/MM3 (ref 4.8–10.8)

## 2020-08-07 ENCOUNTER — HOSPITAL ENCOUNTER (EMERGENCY)
Dept: HOSPITAL 19 - COL.ER | Age: 25
Discharge: HOME | End: 2020-08-07
Payer: COMMERCIAL

## 2020-08-07 VITALS — WEIGHT: 215.39 LBS | HEIGHT: 77.01 IN | BODY MASS INDEX: 25.43 KG/M2

## 2020-08-07 VITALS — HEART RATE: 78 BPM | SYSTOLIC BLOOD PRESSURE: 118 MMHG | DIASTOLIC BLOOD PRESSURE: 64 MMHG | TEMPERATURE: 98.5 F

## 2020-08-07 DIAGNOSIS — R11.10: ICD-10-CM

## 2020-08-07 DIAGNOSIS — F10.129: Primary | ICD-10-CM

## 2020-08-07 LAB
ALBUMIN SERPL-MCNC: 3.6 GM/DL (ref 3.5–5)
ALP SERPL-CCNC: 70 U/L (ref 50–136)
ALT SERPL-CCNC: 53 U/L (ref 4–49)
ANION GAP SERPL CALC-SCNC: 9 MMOL/L (ref 7–16)
AST SERPL-CCNC: 51 U/L (ref 15–37)
BASOPHILS # BLD: 0.1 10*3/UL (ref 0–0.2)
BASOPHILS NFR BLD AUTO: 0.4 % (ref 0–2)
BILIRUB SERPL-MCNC: 1 MG/DL (ref 0–1)
BUN SERPL-MCNC: 19 MG/DL (ref 9–20)
CALCIUM SERPL-MCNC: 7.9 MG/DL (ref 8.4–10.2)
CHLORIDE SERPL-SCNC: 100 MMOL/L (ref 98–107)
CO2 SERPL-SCNC: 31 MMOL/L (ref 22–30)
CREAT SERPL-SCNC: 0.74 UMOL/L (ref 0.66–1.25)
EOSINOPHIL # BLD: 0 10*3/UL (ref 0–0.7)
EOSINOPHIL NFR BLD: 0 % (ref 0–4)
ERYTHROCYTE [DISTWIDTH] IN BLOOD BY AUTOMATED COUNT: 12.5 % (ref 11.5–14.5)
ETHANOL SPEC-SCNC: 273 MG/DL
GLUCOSE SERPL-MCNC: 126 MG/DL (ref 74–106)
GRANULOCYTES # BLD AUTO: 65.8 % (ref 42.2–75.2)
HCT VFR BLD AUTO: 45.2 % (ref 42–52)
HGB BLD-MCNC: 15.4 G/DL (ref 13.5–18)
LIPASE SERPL-CCNC: 55 U/L (ref 23–300)
LYMPHOCYTES # BLD: 3.4 10*3/UL (ref 1.2–3.4)
LYMPHOCYTES NFR BLD: 25.7 % (ref 20–51)
MCH RBC QN AUTO: 30 PG (ref 27–31)
MCHC RBC AUTO-ENTMCNC: 34 G/DL (ref 33–37)
MCV RBC AUTO: 88 FL (ref 80–100)
MONOCYTES # BLD: 1 10*3/UL (ref 0.1–0.6)
MONOCYTES NFR BLD AUTO: 7.7 % (ref 1.7–9.3)
NEUTROPHILS # BLD: 8.8 10*3/UL (ref 1.4–6.5)
PH UR STRIP.AUTO: 6 [PH] (ref 5–8)
PLATELET # BLD AUTO: 260 K/MM3 (ref 130–400)
PMV BLD AUTO: 9.4 FL (ref 7.4–10.4)
POTASSIUM SERPL-SCNC: 3.3 MMOL/L (ref 3.4–5)
PROT SERPL-MCNC: 6 GM/DL (ref 6.4–8.2)
RBC # BLD AUTO: 5.14 M/MM3 (ref 4.2–5.6)
RBC # UR STRIP.AUTO: (no result) /UL
SODIUM SERPL-SCNC: 140 MMOL/L (ref 137–145)
SP GR UR STRIP.AUTO: 1.02 (ref 1–1.03)
UA DIPSTICK PNL UR STRIP.AUTO: (no result)
URN COLLECT METHOD CLASS: (no result)

## 2020-08-07 PROCEDURE — C9113 INJ PANTOPRAZOLE SODIUM, VIA: HCPCS

## 2020-08-09 ENCOUNTER — HOSPITAL ENCOUNTER (EMERGENCY)
Dept: HOSPITAL 6 - ED | Age: 25
Discharge: ANOTHER HOSPITAL | End: 2020-08-09
Payer: COMMERCIAL

## 2020-08-09 ENCOUNTER — HOSPITAL ENCOUNTER (INPATIENT)
Dept: HOSPITAL 19 - IMCU | Age: 25
LOS: 3 days | Discharge: HOME | DRG: 897 | End: 2020-08-12
Attending: STUDENT IN AN ORGANIZED HEALTH CARE EDUCATION/TRAINING PROGRAM | Admitting: STUDENT IN AN ORGANIZED HEALTH CARE EDUCATION/TRAINING PROGRAM
Payer: COMMERCIAL

## 2020-08-09 VITALS — OXYGEN SATURATION: 100 %

## 2020-08-09 VITALS — OXYGEN SATURATION: 97 %

## 2020-08-09 VITALS — OXYGEN SATURATION: 99 %

## 2020-08-09 VITALS
TEMPERATURE: 99 F | DIASTOLIC BLOOD PRESSURE: 80 MMHG | OXYGEN SATURATION: 100 % | SYSTOLIC BLOOD PRESSURE: 121 MMHG | HEART RATE: 109 BPM

## 2020-08-09 VITALS — OXYGEN SATURATION: 94 %

## 2020-08-09 VITALS — OXYGEN SATURATION: 98 %

## 2020-08-09 VITALS — OXYGEN SATURATION: 96 %

## 2020-08-09 VITALS
DIASTOLIC BLOOD PRESSURE: 83 MMHG | SYSTOLIC BLOOD PRESSURE: 131 MMHG | DIASTOLIC BLOOD PRESSURE: 83 MMHG | SYSTOLIC BLOOD PRESSURE: 131 MMHG

## 2020-08-09 VITALS — TEMPERATURE: 97.5 F | SYSTOLIC BLOOD PRESSURE: 136 MMHG | DIASTOLIC BLOOD PRESSURE: 107 MMHG | HEART RATE: 96 BPM

## 2020-08-09 VITALS
SYSTOLIC BLOOD PRESSURE: 130 MMHG | TEMPERATURE: 99 F | OXYGEN SATURATION: 98 % | DIASTOLIC BLOOD PRESSURE: 86 MMHG | HEART RATE: 86 BPM

## 2020-08-09 VITALS — HEIGHT: 77.01 IN | BODY MASS INDEX: 23.77 KG/M2 | WEIGHT: 201.28 LBS

## 2020-08-09 VITALS — OXYGEN SATURATION: 69 %

## 2020-08-09 VITALS — DIASTOLIC BLOOD PRESSURE: 93 MMHG | OXYGEN SATURATION: 100 % | SYSTOLIC BLOOD PRESSURE: 146 MMHG | HEART RATE: 105 BPM

## 2020-08-09 VITALS — TEMPERATURE: 98.6 F | HEART RATE: 114 BPM | DIASTOLIC BLOOD PRESSURE: 86 MMHG | SYSTOLIC BLOOD PRESSURE: 137 MMHG

## 2020-08-09 VITALS — OXYGEN SATURATION: 73 %

## 2020-08-09 VITALS — OXYGEN SATURATION: 71 %

## 2020-08-09 VITALS — OXYGEN SATURATION: 92 %

## 2020-08-09 VITALS — OXYGEN SATURATION: 93 %

## 2020-08-09 VITALS — OXYGEN SATURATION: 91 %

## 2020-08-09 DIAGNOSIS — L30.4: ICD-10-CM

## 2020-08-09 DIAGNOSIS — Z88.0: ICD-10-CM

## 2020-08-09 DIAGNOSIS — E87.6: ICD-10-CM

## 2020-08-09 DIAGNOSIS — F32.9: ICD-10-CM

## 2020-08-09 DIAGNOSIS — F10.239: Primary | ICD-10-CM

## 2020-08-09 DIAGNOSIS — G47.00: ICD-10-CM
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
## 2020-08-09 DIAGNOSIS — R74.0: ICD-10-CM

## 2020-08-09 DIAGNOSIS — R00.0: ICD-10-CM

## 2020-08-09 DIAGNOSIS — A04.72: ICD-10-CM

## 2020-08-09 DIAGNOSIS — F10.229: Primary | ICD-10-CM

## 2020-08-09 LAB
ALBUMIN SERPL-MCNC: 3.2 GM/DL (ref 3.5–5)
ALBUMIN SERPL-MCNC: 3.7 G/DL (ref 3.5–5)
ALP SERPL-CCNC: 61 U/L (ref 50–136)
ALT SERPL-CCNC: 72 U/L (ref 4–49)
ALT SERPL-CCNC: 78 U/L (ref 0–55)
ANION GAP SERPL CALC-SCNC: 8 MMOL/L (ref 7–16)
APAP SERPL-MCNC: 2 UG/ML
APPEARANCE UR: CLEAR
AST SERPL-CCNC: 64 U/L (ref 15–37)
AST SERPL-CCNC: 74 U/L (ref 5–34)
BASOPHILS # BLD: 0 10*3/UL (ref 0.02–0.1)
BASOPHILS # BLD: 0 10*3/UL (ref 0–0.2)
BASOPHILS NFR BLD AUTO: 0.3 % (ref 0–2)
BILIRUB SERPL-MCNC: 1.3 MG/DL (ref 0–1)
BILIRUB SERPL-MCNC: 1.4 MG/DL (ref 0.2–1.2)
BILIRUB UR QL STRIP.AUTO: NEGATIVE
BUN SERPL-MCNC: 7 MG/DL (ref 9–20)
CALCIUM SERPL-MCNC: 7.6 MG/DL (ref 8.4–10.2)
CALCIUM SERPL-MCNC: 7.7 MG/DL (ref 8.3–10.5)
CHLORIDE SERPL-SCNC: 96 MMOL/L (ref 98–107)
CO2 SERPL-SCNC: 24 MMOL/L (ref 22–29)
CO2 SERPL-SCNC: 31 MMOL/L (ref 22–30)
COLOR UR AUTO: (no result)
CREAT SERPL-SCNC: 0.69 UMOL/L (ref 0.66–1.25)
DRUGS UR SCN NOM: NEGATIVE NG/ML
EOSINOPHIL # BLD: 0 10*3/UL (ref 0.04–0.4)
EOSINOPHIL # BLD: 0 10*3/UL (ref 0–0.7)
EOSINOPHIL NFR BLD: 0.1 % (ref 0–4)
EOSINOPHIL NFR BLD: 0.1 % (ref 0–4)
ERYTHROCYTE [DISTWIDTH] IN BLOOD BY AUTOMATED COUNT: 12.4 % (ref 11.5–14.5)
ERYTHROCYTE [DISTWIDTH] IN BLOOD BY AUTOMATED COUNT: 12.7 % (ref 11.5–14.5)
ETHANOL SERPL-MCNC: 228 MG/DL (ref ?–10)
GLUCOSE SERPL-MCNC: 116 MG/DL (ref 75–110)
GLUCOSE SERPL-MCNC: 142 MG/DL (ref 74–106)
GLUCOSE UR QL STRIP.AUTO: (no result) MG/DL
GRANULOCYTES # BLD AUTO: 63.3 % (ref 42.2–75.2)
HCT VFR BLD AUTO: 41.4 % (ref 42–52)
HCT VFR BLD AUTO: 44.6 % (ref 42–52)
HGB BLD-MCNC: 14.4 G/DL (ref 13.5–18)
HGB BLD-MCNC: 15.5 G/DL (ref 13.5–18)
KETONES UR QL STRIP.AUTO: NEGATIVE
LACTATE SERPL-SCNC: 3.1 MMOL/L (ref 0.4–2)
LEUKOCYTE ESTERASE UR QL STRIP: NEGATIVE
LYMPHOCYTES # BLD: 2.1 10*3/UL (ref 1.2–3.4)
LYMPHOCYTES # BLD: 2.5 10*3/UL (ref 1.5–4)
LYMPHOCYTES NFR BLD: 26.6 % (ref 20–51)
MAGNESIUM SERPL-MCNC: 1.59 MG/DL (ref 1.6–2.6)
MCH RBC QN AUTO: 30 PG (ref 27–31)
MCH RBC QN AUTO: 30 PG (ref 27–31)
MCHC RBC AUTO-ENTMCNC: 35 G/DL (ref 33–37)
MCHC RBC AUTO-ENTMCNC: 35 G/DL (ref 33–37)
MCV RBC AUTO: 86 FL (ref 78–100)
MCV RBC AUTO: 87 FL (ref 80–100)
MONOCYTES # BLD: 0.7 10*3/UL (ref 0.1–0.6)
MONOCYTES # BLD: 1.1 10*3/UL (ref 0.2–0.8)
MONOCYTES NFR BLD AUTO: 9.3 % (ref 1.7–9.3)
NEUTROPHILS # BLD: 5 10*3/UL (ref 1.4–6.5)
NEUTROPHILS # BLD: 6.2 10*3/UL (ref 1.4–6.5)
NITRITE UR QL STRIP: NEGATIVE
PH UR STRIP.AUTO: 6.5 [PH] (ref 5–8)
PH UR STRIP.AUTO: 7 [PH] (ref 5–8)
PLATELET # BLD AUTO: 166 K/MM3 (ref 130–400)
PLATELET # BLD AUTO: 257 K/MM3 (ref 130–400)
PMV BLD AUTO: 10 FL (ref 7.4–10.4)
PMV BLD AUTO: 9.9 FL (ref 7.4–10.4)
POTASSIUM SERPL-SCNC: 2.7 MMOL/L (ref 3.5–5.1)
POTASSIUM SERPL-SCNC: 3 MMOL/L (ref 3.4–5)
PROT ?TM UR-MCNC: NEGATIVE MG/DL
PROT SERPL-MCNC: 5.3 GM/DL (ref 6.4–8.2)
PROT SERPL-MCNC: 5.9 G/DL (ref 6.4–8.3)
RBC # BLD AUTO: 4.75 M/MM3 (ref 4.2–5.6)
RBC # BLD AUTO: 5.2 M/MM3 (ref 4.2–5.6)
RBC # UR STRIP.AUTO: (no result) /UL
RBC UR QL AUTO: NEGATIVE
SODIUM SERPL-SCNC: 135 MMOL/L (ref 137–145)
SODIUM SERPL-SCNC: 139 MMOL/L (ref 136–145)
SP GR UR STRIP.AUTO: 1.01 (ref 1–1.03)
SP GR UR STRIP.AUTO: 1.02 (ref 1–1.03)
URN COLLECT METHOD CLASS: (no result)
UROBILINOGEN UR-MCNC: NORMAL MG/DL
WBC # BLD AUTO: 9.8 K/MM3 (ref 4.8–10.8)
WBC #/AREA URNS HPF: (no result) /HPF (ref 0–3)

## 2020-08-09 NOTE — NUR
PT HAVING NAUSEA AND VOMITTING. PT GIVEN MORE WATER AND SPRITE. ATIVAN GIVEN
FOR DETOX SCORE.  IT IS TOO EARLY FOR [facility].  CALLED
FOR ADDTIONAL ORDERS. AWAITING ORDERS.

## 2020-08-09 NOTE — NUR
DENICE Ruff speaks with the patient at this time. Patient repeats over and
over that he has detoxed before and that he wants to go home. He "can't stay
here for two days. I will die". Patient is educated that if he leaves AMA that
he will likely have to pay out of pocket for this hospital stay. Patient says
he cant afford that. Explained that there is nothing we can do if he leaves
against advice. Patient states "well, why can't they just make it with
advice?" explained that patient hasnt even started detoxing yet and it likely
to get worse before better. Patient is scoring high on detox scale. Also told
that he has lab work that is not within normal range and a doctor will not
approve him to leave with them being that way. Assisted patient with calling
his wife who states she will not come pick him up. Patient now changes his
tone and states that he will be staying "because I can't afford to pay out of
pocket". Will continue to monitor.

## 2020-08-09 NOTE — NUR
PT ADMITTED FROM [facility] FOR ETOH. PT IS LETHARGIC AND DROWSY. PT HELPED
TRANSFER TO BED. PT ORIENTED TO ROOM AND FLOOR. PT FALLING ALSEEP BETWEEN
QUESTIONS. PT INSTRUCTED MULTIPLE TIMES NOT TO GET UP WITH OUT ASSISTANCE AND
ORIENTED TO CALL LIGHT. BED ALARM PLACED. VSS. PT SHOWING SR ON TELE. PT ASKED
REGARDING LAST DRINK AND HE STATES " I DONT KNOW". INSTRUCTED PT THAT TODAY
WAS SUNDAY WAS HIS LAST DRINK FRIDAY OR SATURDAY. PT REPEATS " I DONT KNOW".
ASKED PT HOW MUCH DOES HE NORMALLY DRINK AND PT DOESNT ANSWER. ASSESSMENT
COMPLETED. PT STATES HIS WIFE IS IN CASE OF EMERGENCY CONTACT. TOOK MULITPLE
ATTEMPTS AND RE-AWAKENING PT TO GET WIFES PHONE NUMBER. PT HELPED TO STAND AND
USE THE URINAL. HOSPITALIST NOTIFIED OF ARRIVAL. PT REMINED NOT TO GET OUT OF
BED AND HOW TO USE CALL LIGHT. BED ALARM ACTIVE. LAB CALLED AND RT CALLED FOR
EKG. WILL CONTINUE TO [facility].

## 2020-08-09 NOTE — NUR
LABS DRAWN, UA SENT, AND EKG DONE.  BEDSIDE TO EVALUATE PT. PT STILL
LETHARGIC AND FALLING ASLEEP DURING QUESTIONS. PT REMINDED NOT TO GET OUT OF
BED AND HOW TO USE CALL LIGHT. BED ALARM ACTIVE.

## 2020-08-09 NOTE — NUR
Patient is calling at this time stating that he wants to go home and that he
"can't stay here any longer". Patient states he would call his wife for
transportation home. DENICE Ruff called and notified. Says she will be down
shortly to speak to him.

## 2020-08-10 VITALS — OXYGEN SATURATION: 98 %

## 2020-08-10 VITALS — OXYGEN SATURATION: 84 %

## 2020-08-10 VITALS — HEART RATE: 111 BPM | SYSTOLIC BLOOD PRESSURE: 154 MMHG | DIASTOLIC BLOOD PRESSURE: 104 MMHG | TEMPERATURE: 98.4 F

## 2020-08-10 VITALS — OXYGEN SATURATION: 97 %

## 2020-08-10 VITALS — OXYGEN SATURATION: 99 %

## 2020-08-10 VITALS — OXYGEN SATURATION: 100 %

## 2020-08-10 VITALS — OXYGEN SATURATION: 82 %

## 2020-08-10 VITALS — OXYGEN SATURATION: 80 %

## 2020-08-10 VITALS — OXYGEN SATURATION: 83 %

## 2020-08-10 VITALS — OXYGEN SATURATION: 87 %

## 2020-08-10 VITALS — OXYGEN SATURATION: 88 %

## 2020-08-10 VITALS — OXYGEN SATURATION: 95 %

## 2020-08-10 VITALS — OXYGEN SATURATION: 93 %

## 2020-08-10 VITALS — OXYGEN SATURATION: 96 %

## 2020-08-10 VITALS — HEART RATE: 125 BPM | SYSTOLIC BLOOD PRESSURE: 125 MMHG | DIASTOLIC BLOOD PRESSURE: 91 MMHG | TEMPERATURE: 98.8 F

## 2020-08-10 VITALS — OXYGEN SATURATION: 90 %

## 2020-08-10 VITALS
SYSTOLIC BLOOD PRESSURE: 101 MMHG | DIASTOLIC BLOOD PRESSURE: 71 MMHG | TEMPERATURE: 99 F | HEART RATE: 121 BPM | OXYGEN SATURATION: 99 %

## 2020-08-10 VITALS — OXYGEN SATURATION: 91 %

## 2020-08-10 VITALS — DIASTOLIC BLOOD PRESSURE: 73 MMHG | HEART RATE: 96 BPM | TEMPERATURE: 98.4 F | SYSTOLIC BLOOD PRESSURE: 131 MMHG

## 2020-08-10 VITALS
OXYGEN SATURATION: 98 % | TEMPERATURE: 98.5 F | DIASTOLIC BLOOD PRESSURE: 99 MMHG | SYSTOLIC BLOOD PRESSURE: 157 MMHG | HEART RATE: 78 BPM

## 2020-08-10 VITALS — SYSTOLIC BLOOD PRESSURE: 111 MMHG | DIASTOLIC BLOOD PRESSURE: 78 MMHG | HEART RATE: 76 BPM | TEMPERATURE: 97.5 F

## 2020-08-10 VITALS — OXYGEN SATURATION: 94 %

## 2020-08-10 VITALS — OXYGEN SATURATION: 92 %

## 2020-08-10 VITALS — OXYGEN SATURATION: 86 %

## 2020-08-10 VITALS — OXYGEN SATURATION: 85 %

## 2020-08-10 LAB
ANION GAP SERPL CALC-SCNC: 5 MMOL/L (ref 7–16)
BASOPHILS # BLD: 0 10*3/UL (ref 0–0.2)
BASOPHILS NFR BLD AUTO: 0.3 % (ref 0–2)
BUN SERPL-MCNC: 8 MG/DL (ref 9–20)
CALCIUM SERPL-MCNC: 8.4 MG/DL (ref 8.4–10.2)
CHLORIDE SERPL-SCNC: 101 MMOL/L (ref 98–107)
CO2 SERPL-SCNC: 28 MMOL/L (ref 22–30)
CREAT SERPL-SCNC: 0.7 UMOL/L (ref 0.66–1.25)
EOSINOPHIL # BLD: 0.1 10*3/UL (ref 0–0.7)
EOSINOPHIL NFR BLD: 1.5 % (ref 0–4)
ERYTHROCYTE [DISTWIDTH] IN BLOOD BY AUTOMATED COUNT: 12.5 % (ref 11.5–14.5)
GLUCOSE SERPL-MCNC: 112 MG/DL (ref 74–106)
GRANULOCYTES # BLD AUTO: 70.1 % (ref 42.2–75.2)
HCT VFR BLD AUTO: 40.6 % (ref 42–52)
HGB BLD-MCNC: 14 G/DL (ref 13.5–18)
LYMPHOCYTES # BLD: 2 10*3/UL (ref 1.2–3.4)
LYMPHOCYTES NFR BLD: 22.1 % (ref 20–51)
MCH RBC QN AUTO: 30 PG (ref 27–31)
MCHC RBC AUTO-ENTMCNC: 35 G/DL (ref 33–37)
MCV RBC AUTO: 87 FL (ref 80–100)
MONOCYTES # BLD: 0.5 10*3/UL (ref 0.1–0.6)
MONOCYTES NFR BLD AUTO: 5.8 % (ref 1.7–9.3)
NEUTROPHILS # BLD: 6.3 10*3/UL (ref 1.4–6.5)
PLATELET # BLD AUTO: 146 K/MM3 (ref 130–400)
PMV BLD AUTO: 10.4 FL (ref 7.4–10.4)
POTASSIUM SERPL-SCNC: 3.3 MMOL/L (ref 3.4–5)
RBC # BLD AUTO: 4.67 M/MM3 (ref 4.2–5.6)
SODIUM SERPL-SCNC: 133 MMOL/L (ref 137–145)

## 2020-08-10 NOTE — NUR
Patient is confused and continually asks when he can go home. Explained he
already spoke with the APRN about this and that it would likely be a few days.
He says ok and requests some grape juice. provided.

## 2020-08-10 NOTE — NUR
Patient is once again stating he is going to leave and he requests to speak to
DENICE Ruff. SHe is notified and comes to see the patient. The same
conversation is had as earlier in the shift, He will not be cleared medically
until he is past his detox window and stable. Patient is still scoring 10's on
his CIWA scale. Patient states that he hasn't eaten anything all night, he
finished a sandwich box less than an hour previous and has had 4 cups of
broth. Patient is given more snacks of crackers and peanut butter. It is
determined once again that the patient will not be leaving AMA at this time.

## 2020-08-11 VITALS — OXYGEN SATURATION: 98 %

## 2020-08-11 VITALS — OXYGEN SATURATION: 96 %

## 2020-08-11 VITALS — OXYGEN SATURATION: 99 %

## 2020-08-11 VITALS — OXYGEN SATURATION: 89 %

## 2020-08-11 VITALS — OXYGEN SATURATION: 97 %

## 2020-08-11 VITALS — SYSTOLIC BLOOD PRESSURE: 120 MMHG | TEMPERATURE: 97.4 F | HEART RATE: 61 BPM | DIASTOLIC BLOOD PRESSURE: 79 MMHG

## 2020-08-11 VITALS — HEART RATE: 62 BPM | SYSTOLIC BLOOD PRESSURE: 109 MMHG | DIASTOLIC BLOOD PRESSURE: 50 MMHG | TEMPERATURE: 98.6 F

## 2020-08-11 VITALS — OXYGEN SATURATION: 82 %

## 2020-08-11 VITALS — OXYGEN SATURATION: 95 %

## 2020-08-11 VITALS — TEMPERATURE: 98.7 F | HEART RATE: 61 BPM | DIASTOLIC BLOOD PRESSURE: 72 MMHG | SYSTOLIC BLOOD PRESSURE: 124 MMHG

## 2020-08-11 VITALS — OXYGEN SATURATION: 91 %

## 2020-08-11 VITALS — DIASTOLIC BLOOD PRESSURE: 72 MMHG | TEMPERATURE: 98.3 F | SYSTOLIC BLOOD PRESSURE: 115 MMHG | HEART RATE: 59 BPM

## 2020-08-11 VITALS — TEMPERATURE: 97.7 F | DIASTOLIC BLOOD PRESSURE: 92 MMHG | HEART RATE: 109 BPM | SYSTOLIC BLOOD PRESSURE: 155 MMHG

## 2020-08-11 VITALS — OXYGEN SATURATION: 78 %

## 2020-08-11 VITALS — DIASTOLIC BLOOD PRESSURE: 80 MMHG | TEMPERATURE: 98 F | HEART RATE: 76 BPM | SYSTOLIC BLOOD PRESSURE: 121 MMHG

## 2020-08-11 VITALS — OXYGEN SATURATION: 100 %

## 2020-08-11 VITALS — OXYGEN SATURATION: 74 %

## 2020-08-11 VITALS — OXYGEN SATURATION: 86 %

## 2020-08-11 VITALS — OXYGEN SATURATION: 79 %

## 2020-08-11 VITALS — OXYGEN SATURATION: 93 %

## 2020-08-11 LAB
ANION GAP SERPL CALC-SCNC: 4 MMOL/L (ref 7–16)
BASOPHILS # BLD: 0 10*3/UL (ref 0–0.2)
BASOPHILS NFR BLD AUTO: 0.5 % (ref 0–2)
BUN SERPL-MCNC: 14 MG/DL (ref 9–20)
CALCIUM SERPL-MCNC: 8.8 MG/DL (ref 8.4–10.2)
CHLORIDE SERPL-SCNC: 101 MMOL/L (ref 98–107)
CO2 SERPL-SCNC: 28 MMOL/L (ref 22–30)
CREAT SERPL-SCNC: 0.67 UMOL/L (ref 0.66–1.25)
EOSINOPHIL # BLD: 0.3 10*3/UL (ref 0–0.7)
EOSINOPHIL NFR BLD: 5.9 % (ref 0–4)
ERYTHROCYTE [DISTWIDTH] IN BLOOD BY AUTOMATED COUNT: 12.8 % (ref 11.5–14.5)
GLUCOSE SERPL-MCNC: 137 MG/DL (ref 74–106)
GRANULOCYTES # BLD AUTO: 60.6 % (ref 42.2–75.2)
HCT VFR BLD AUTO: 39.3 % (ref 42–52)
HGB BLD-MCNC: 13.7 G/DL (ref 13.5–18)
LYMPHOCYTES # BLD: 1.1 10*3/UL (ref 1.2–3.4)
LYMPHOCYTES NFR BLD: 26.9 % (ref 20–51)
MCH RBC QN AUTO: 30 PG (ref 27–31)
MCHC RBC AUTO-ENTMCNC: 35 G/DL (ref 33–37)
MCV RBC AUTO: 87 FL (ref 80–100)
MONOCYTES # BLD: 0.3 10*3/UL (ref 0.1–0.6)
MONOCYTES NFR BLD AUTO: 5.9 % (ref 1.7–9.3)
NEUTROPHILS # BLD: 2.6 10*3/UL (ref 1.4–6.5)
PLATELET # BLD AUTO: 128 K/MM3 (ref 130–400)
PMV BLD AUTO: 10.4 FL (ref 7.4–10.4)
POTASSIUM SERPL-SCNC: 3.8 MMOL/L (ref 3.4–5)
RBC # BLD AUTO: 4.51 M/MM3 (ref 4.2–5.6)
SODIUM SERPL-SCNC: 133 MMOL/L (ref 137–145)

## 2020-08-11 NOTE — NUR
PT RESTING IN BED, DENIES PAIN, MUCH MORE A/O THAN AT START OF SHIFT. PT
REMAINS ON PRECEDEX FOR ANIETY AND WITHDRAWLS. PT STATING THAT HE WANTS TO GO
HOME, HE WANTS TO SIGN OUT AMA. STAFF REQUESTED THAT PT GET SOME REST AND
RE-EVAL ZAHIDA WISHES IN AM WITH MD. PT AGREES AT THIS TIME, PT APPEARS
COMFORTABLE WITH 2ND SANDWISH TRAY AT BEDSIDE. WILL CONTINUE TO MONITOR PT
STATUS AND UPDATE PROVIDERS AS NEEDED.

## 2020-08-11 NOTE — NUR
GAY met with the patient to discuss discharge plan. The patient lives in [place]
with his wife, Leila (ph#[phone]), and [id number]-[id number]-uly daughter. He reports
independence with ADLs and does not have any DME. He works at [employer].
The patient's primary care provider is SAIGE Hair and he receives his
medications at [facility]. He reports no difficulties obtaining his meds. The
patient does not have advanced directives and he was not interested in
completing them at this time. GAY then addressed the patient's alcohol use. The
patient reports that he has been to an inpatient treatment center, [facility], back in June. SW discussed inpatient treatment again after
his hospital stay here. The patient reports that he does not want inpatient
treatment at this time. He states that he misses his daughter and wife and
plans to return home upon discharge. SW discussed outpatient treatment. The
patient reports that he would be interested in this, but is not interested in
GAY setting any outpatient treatment up at this time. GAY provided the patient a
list of the different outpatient alcohol treatment and AA meetings around
[place]. GAY then contacted the patient's wife, Leila. Leila reports that the
patient was in [facility] all of June and remained sober after he got out,
but then relapsed while she and baby were in [place]. She states that she
was there for ten days and just got back. Leila reports that she would like for
the patient to follow through with outpatient treatment. Leila had no other
questions for GAY at this time. GAY to continue to follow.

## 2020-08-11 NOTE — NUR
Pt awakens to voice, lifts head off bed, makes eye contact asking "is it
Saturday or Sunday? Where am I?" Pt reoriented. Pt expressing wishes to "get
out of here. I need to leave" - Education provided

## 2020-08-12 VITALS — HEART RATE: 115 BPM | TEMPERATURE: 97.9 F | SYSTOLIC BLOOD PRESSURE: 151 MMHG | DIASTOLIC BLOOD PRESSURE: 99 MMHG

## 2020-08-12 VITALS — TEMPERATURE: 98.7 F | HEART RATE: 104 BPM | DIASTOLIC BLOOD PRESSURE: 85 MMHG | SYSTOLIC BLOOD PRESSURE: 148 MMHG

## 2020-08-12 VITALS — DIASTOLIC BLOOD PRESSURE: 96 MMHG | SYSTOLIC BLOOD PRESSURE: 157 MMHG | HEART RATE: 119 BPM | TEMPERATURE: 97.2 F

## 2020-08-12 LAB
ANION GAP SERPL CALC-SCNC: 7 MMOL/L (ref 7–16)
BUN SERPL-MCNC: 13 MG/DL (ref 9–20)
C DIFF TOX A+B STL IA-ACNC: (no result)
C DIFF TOX A+B STL QL IA: (no result)
CALCIUM SERPL-MCNC: 8.5 MG/DL (ref 8.4–10.2)
CHLORIDE SERPL-SCNC: 103 MMOL/L (ref 98–107)
CO2 SERPL-SCNC: 25 MMOL/L (ref 22–30)
CREAT SERPL-SCNC: 0.74 UMOL/L (ref 0.66–1.25)
ERYTHROCYTE [DISTWIDTH] IN BLOOD BY AUTOMATED COUNT: 13.1 % (ref 11.5–14.5)
GLUCOSE SERPL-MCNC: 121 MG/DL (ref 74–106)
HCT VFR BLD AUTO: 39.6 % (ref 42–52)
HGB BLD-MCNC: 13.6 G/DL (ref 13.5–18)
MCH RBC QN AUTO: 30 PG (ref 27–31)
MCHC RBC AUTO-ENTMCNC: 34 G/DL (ref 33–37)
MCV RBC AUTO: 88 FL (ref 80–100)
PLATELET # BLD AUTO: 150 K/MM3 (ref 130–400)
PMV BLD AUTO: 10.3 FL (ref 7.4–10.4)
POTASSIUM SERPL-SCNC: 3.5 MMOL/L (ref 3.4–5)
RBC # BLD AUTO: 4.48 M/MM3 (ref 4.2–5.6)
SODIUM SERPL-SCNC: 135 MMOL/L (ref 137–145)

## 2020-08-12 NOTE — NUR
MD Stefanie contacted - MD ok to discharge home without antibiotics required to
just be cautious of systemic antibiotics - pt educated

## 2020-08-12 NOTE — NUR
SW attended clinical rounds. The patient is to discharge back home with his
wife today, 8/12. No additional needs at this time.

## 2020-08-12 NOTE — NUR
PT'S GI PANEL CAME BACK
POSITIVE FOR C DIFF TOXIN AND E COLI. THIS WAS REPORTED
TO DENICE ALBARRAN. SECOND TEST ORDERED AND NOW AWAITING FOR RESULTS. PT'S NOW
ON FLUIDS.

## 2020-08-12 NOTE — NUR
MD Jacky ok to discharge pt despite MD Stefanie from Infectious Disease has not
returned call for consultation

## 2020-08-12 NOTE — NUR
PT VOMITED AGAIN IN THE TOILET, APPEARS TO BE TAN IN COLOR  AND MOSTLY FOOD AS
HE ATE TUR[employer] EARLIER. ZOFRAN NOT AVAILABLE. DENICE ALBARRAN NOTIFIED
AND ORDERED PHENERGAN WHICH WAS GIVEN.

## 2020-08-13 ENCOUNTER — HOSPITAL ENCOUNTER (OUTPATIENT)
Dept: HOSPITAL 6 - RAD | Age: 25
End: 2020-08-13
Attending: PHYSICIAN ASSISTANT
Payer: COMMERCIAL

## 2020-08-13 DIAGNOSIS — M25.572: Primary | ICD-10-CM

## 2020-09-26 ENCOUNTER — HOSPITAL ENCOUNTER (EMERGENCY)
Dept: HOSPITAL 6 - ED | Age: 25
Discharge: HOME | End: 2020-09-26
Payer: COMMERCIAL

## 2020-09-26 VITALS — HEIGHT: 77.01 IN | BODY MASS INDEX: 23.66 KG/M2 | WEIGHT: 200.4 LBS

## 2020-09-26 VITALS
DIASTOLIC BLOOD PRESSURE: 88 MMHG | SYSTOLIC BLOOD PRESSURE: 158 MMHG | DIASTOLIC BLOOD PRESSURE: 88 MMHG | SYSTOLIC BLOOD PRESSURE: 158 MMHG

## 2020-09-26 DIAGNOSIS — J06.9: Primary | ICD-10-CM

## 2020-09-26 DIAGNOSIS — Z90.49: ICD-10-CM

## 2020-09-26 DIAGNOSIS — Z20.828: ICD-10-CM

## 2020-09-26 LAB
ALBUMIN SERPL-MCNC: 4.1 G/DL (ref 3.5–5)
ALT SERPL-CCNC: 67 U/L (ref 0–55)
AST SERPL-CCNC: 42 U/L (ref 5–34)
BASOPHILS # BLD: 0 10*3/UL (ref 0.02–0.1)
BILIRUB SERPL-MCNC: 0.2 MG/DL (ref 0.2–1.2)
CALCIUM SERPL-MCNC: 9.2 MG/DL (ref 8.3–10.5)
CO2 SERPL-SCNC: 27 MMOL/L (ref 22–29)
EOSINOPHIL # BLD: 0 10*3/UL (ref 0.04–0.4)
EOSINOPHIL NFR BLD: 0.2 % (ref 0–4)
ERYTHROCYTE [DISTWIDTH] IN BLOOD BY AUTOMATED COUNT: 13.1 % (ref 11.5–14.5)
GLUCOSE SERPL-MCNC: 125 MG/DL (ref 75–110)
HCT VFR BLD AUTO: 41.5 % (ref 42–52)
HGB BLD-MCNC: 13.7 G/DL (ref 13.5–18)
LYMPHOCYTES # BLD: 1.5 10*3/UL (ref 1.5–4)
MCH RBC QN AUTO: 29 PG (ref 27–31)
MCHC RBC AUTO-ENTMCNC: 33 G/DL (ref 33–37)
MCV RBC AUTO: 89 FL (ref 78–100)
MONOCYTES # BLD: 0.5 10*3/UL (ref 0.2–0.8)
NEUTROPHILS # BLD: 3 10*3/UL (ref 1.4–6.5)
PLATELET # BLD AUTO: 364 K/MM3 (ref 130–400)
PMV BLD AUTO: 8.9 FL (ref 7.4–10.4)
POTASSIUM SERPL-SCNC: 4.2 MMOL/L (ref 3.5–5.1)
PROT SERPL-MCNC: 6.6 G/DL (ref 6.4–8.3)
RBC # BLD AUTO: 4.66 M/MM3 (ref 4.2–5.6)
SODIUM SERPL-SCNC: 143 MMOL/L (ref 136–145)
WBC # BLD AUTO: 5 K/MM3 (ref 4.8–10.8)

## 2020-12-16 ENCOUNTER — HOSPITAL ENCOUNTER (OUTPATIENT)
Dept: HOSPITAL 6 - RAD | Age: 25
End: 2020-12-16
Attending: NURSE PRACTITIONER
Payer: COMMERCIAL

## 2020-12-16 DIAGNOSIS — M25.561: Primary | ICD-10-CM

## 2021-04-01 ENCOUNTER — HOSPITAL ENCOUNTER (EMERGENCY)
Dept: HOSPITAL 19 - COL.ER | Age: 26
Discharge: HOME | End: 2021-04-01
Attending: EMERGENCY MEDICINE
Payer: COMMERCIAL

## 2021-04-01 VITALS — DIASTOLIC BLOOD PRESSURE: 94 MMHG | SYSTOLIC BLOOD PRESSURE: 143 MMHG | HEART RATE: 81 BPM

## 2021-04-01 VITALS — BODY MASS INDEX: 23.66 KG/M2 | HEIGHT: 77.01 IN | WEIGHT: 200.4 LBS

## 2021-04-01 VITALS — TEMPERATURE: 97.8 F

## 2021-04-01 DIAGNOSIS — G40.409: Primary | ICD-10-CM

## 2021-04-01 DIAGNOSIS — R51.9: ICD-10-CM

## 2021-04-01 DIAGNOSIS — Z88.1: ICD-10-CM

## 2021-04-01 LAB
ALBUMIN SERPL-MCNC: 4.8 GM/DL (ref 3.5–5)
ALP SERPL-CCNC: 65 U/L (ref 50–136)
ALT SERPL-CCNC: 21 U/L (ref 4–49)
ANION GAP SERPL CALC-SCNC: 15 MMOL/L (ref 7–16)
AST SERPL-CCNC: 26 U/L (ref 15–37)
BASOPHILS # BLD: 0 10*3/UL (ref 0–0.2)
BASOPHILS NFR BLD AUTO: 0.5 % (ref 0–2)
BILIRUB SERPL-MCNC: 0.3 MG/DL (ref 0–1)
BUN SERPL-MCNC: 13 MG/DL (ref 9–20)
CALCIUM SERPL-MCNC: 9.8 MG/DL (ref 8.4–10.2)
CHLORIDE SERPL-SCNC: 100 MMOL/L (ref 98–107)
CO2 SERPL-SCNC: 23 MMOL/L (ref 22–30)
CREAT SERPL-SCNC: 1.05 UMOL/L (ref 0.66–1.25)
DIGOXIN SERPL-MCNC: 74.1 NG/ML (ref 3.7–17.9)
EOSINOPHIL # BLD: 0.1 10*3/UL (ref 0–0.7)
EOSINOPHIL NFR BLD: 1.3 % (ref 0–4)
ERYTHROCYTE [DISTWIDTH] IN BLOOD BY AUTOMATED COUNT: 14.1 % (ref 11.5–14.5)
GLUCOSE SERPL-MCNC: 96 MG/DL (ref 74–106)
GRANULOCYTES # BLD AUTO: 61.9 % (ref 42.2–75.2)
HCT VFR BLD AUTO: 41.7 % (ref 42–52)
HGB BLD-MCNC: 13.7 G/DL (ref 13.5–18)
LYMPHOCYTES # BLD: 1.7 10*3/UL (ref 1.2–3.4)
LYMPHOCYTES NFR BLD: 28.2 % (ref 20–51)
MCH RBC QN AUTO: 29 PG (ref 27–31)
MCHC RBC AUTO-ENTMCNC: 33 G/DL (ref 33–37)
MCV RBC AUTO: 88 FL (ref 80–100)
MONOCYTES # BLD: 0.5 10*3/UL (ref 0.1–0.6)
MONOCYTES NFR BLD AUTO: 7.8 % (ref 1.7–9.3)
NEUTROPHILS # BLD: 3.8 10*3/UL (ref 1.4–6.5)
PLATELET # BLD AUTO: 239 K/MM3 (ref 130–400)
PMV BLD AUTO: 10 FL (ref 7.4–10.4)
POTASSIUM SERPL-SCNC: 3.9 MMOL/L (ref 3.4–5)
PROT SERPL-MCNC: 7.8 GM/DL (ref 6.4–8.2)
RBC # BLD AUTO: 4.72 M/MM3 (ref 4.2–5.6)
SODIUM SERPL-SCNC: 138 MMOL/L (ref 137–145)

## 2021-05-17 ENCOUNTER — HOSPITAL ENCOUNTER (EMERGENCY)
Dept: HOSPITAL 6 - ED | Age: 26
Discharge: ANOTHER HOSPITAL | End: 2021-05-17
Payer: COMMERCIAL

## 2021-05-17 VITALS — SYSTOLIC BLOOD PRESSURE: 111 MMHG | DIASTOLIC BLOOD PRESSURE: 56 MMHG

## 2021-05-17 DIAGNOSIS — G40.909: ICD-10-CM

## 2021-05-17 DIAGNOSIS — Z79.899: ICD-10-CM

## 2021-05-17 DIAGNOSIS — Z20.822: ICD-10-CM

## 2021-05-17 DIAGNOSIS — F32.9: ICD-10-CM

## 2021-05-17 DIAGNOSIS — F41.9: ICD-10-CM

## 2021-05-17 DIAGNOSIS — T42.4X1A: Primary | ICD-10-CM

## 2021-05-17 DIAGNOSIS — F10.10: ICD-10-CM

## 2021-05-17 DIAGNOSIS — J45.909: ICD-10-CM
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
## 2021-05-17 LAB
ALBUMIN SERPL-MCNC: 4.4 G/DL (ref 3.5–5)
ALT SERPL-CCNC: 27 U/L (ref 0–55)
APAP SERPL-MCNC: < 1 UG/ML
APPEARANCE UR: CLEAR
AST SERPL-CCNC: 21 U/L (ref 5–34)
BASOPHILS # BLD: 0.02 10*3/UL (ref 0.02–0.1)
BILIRUB SERPL-MCNC: 0.2 MG/DL (ref 0.2–1.2)
BILIRUB UR QL STRIP.AUTO: NEGATIVE
CALCIUM SERPL-MCNC: 9.2 MG/DL (ref 8.3–10.5)
CO2 SERPL-SCNC: 24 MMOL/L (ref 22–29)
COLOR UR AUTO: YELLOW
EOSINOPHIL # BLD: 0.06 10*3/UL (ref 0.04–0.4)
EOSINOPHIL NFR BLD: 0.9 % (ref 0–4)
ERYTHROCYTE [DISTWIDTH] IN BLOOD BY AUTOMATED COUNT: 13.7 % (ref 11.5–14.5)
ETHANOL SERPL-MCNC: 292 MG/DL (ref ?–10)
GLUCOSE SERPL-MCNC: 110 MG/DL (ref 75–110)
GLUCOSE UR QL STRIP.AUTO: NEGATIVE MG/DL
HCT VFR BLD AUTO: 42.3 % (ref 42–52)
HGB BLD-MCNC: 14.2 G/DL (ref 13.5–18)
KETONES UR QL STRIP.AUTO: NEGATIVE
LEUKOCYTE ESTERASE UR QL STRIP: NEGATIVE
LIPASE SERPL-CCNC: 38 U/L (ref 8–78)
LYMPHOCYTES # BLD: 1.78 10*3/UL (ref 1.5–4)
MCH RBC QN AUTO: 29 PG (ref 27–31)
MCHC RBC AUTO-ENTMCNC: 34 G/DL (ref 33–37)
MCV RBC AUTO: 86 FL (ref 78–100)
MONOCYTES # BLD: 0.33 10*3/UL (ref 0.2–0.8)
NEUTROPHILS # BLD: 4.42 10*3/UL (ref 1.4–6.5)
NITRITE UR QL STRIP: NEGATIVE
PH UR STRIP.AUTO: 5 [PH] (ref 5–8)
PLATELET # BLD AUTO: 262 K/MM3 (ref 130–400)
PMV BLD AUTO: 9.7 FL (ref 7.4–10.4)
POTASSIUM SERPL-SCNC: 3.6 MMOL/L (ref 3.5–5.1)
PROT ?TM UR-MCNC: NEGATIVE MG/DL
PROT SERPL-MCNC: 7.4 G/DL (ref 6.4–8.3)
RBC # BLD AUTO: 4.91 M/MM3 (ref 4.2–5.6)
RBC UR QL AUTO: NEGATIVE
SODIUM SERPL-SCNC: 144 MMOL/L (ref 136–145)
SP GR UR STRIP.AUTO: 1 (ref 1–1.03)
UROBILINOGEN UR-MCNC: NORMAL MG/DL
WBC # BLD AUTO: 6.6 K/MM3 (ref 4.8–10.8)
WBC #/AREA URNS HPF: 0 /HPF (ref 0–3)

## 2021-05-28 ENCOUNTER — HOSPITAL ENCOUNTER (EMERGENCY)
Dept: HOSPITAL 6 - ED | Age: 26
Discharge: ANOTHER HOSPITAL | End: 2021-05-28
Payer: COMMERCIAL

## 2021-05-28 ENCOUNTER — HOSPITAL ENCOUNTER (OUTPATIENT)
Dept: HOSPITAL 19 - ZLAB.WCH | Age: 26
End: 2021-05-28

## 2021-05-28 VITALS — SYSTOLIC BLOOD PRESSURE: 125 MMHG | DIASTOLIC BLOOD PRESSURE: 79 MMHG

## 2021-05-28 DIAGNOSIS — J18.9: Primary | ICD-10-CM

## 2021-05-28 DIAGNOSIS — F10.129: ICD-10-CM

## 2021-05-28 DIAGNOSIS — Z20.822: ICD-10-CM

## 2021-05-28 DIAGNOSIS — R04.2: ICD-10-CM

## 2021-05-28 DIAGNOSIS — K56.7: ICD-10-CM

## 2021-05-28 DIAGNOSIS — R94.5: ICD-10-CM

## 2021-05-28 DIAGNOSIS — J45.909: ICD-10-CM

## 2021-05-28 DIAGNOSIS — A41.9: ICD-10-CM

## 2021-05-28 DIAGNOSIS — Z01.89: Primary | ICD-10-CM

## 2021-05-28 LAB
ALBUMIN SERPL-MCNC: 4.2 G/DL (ref 3.5–5)
ALT SERPL-CCNC: 174 U/L (ref 0–55)
APAP SERPL-MCNC: < 1 UG/ML
APPEARANCE UR: (no result)
APTT PPP: 28.8 SECONDS (ref 21–32)
AST SERPL-CCNC: 420 U/L (ref 5–34)
BILIRUB SERPL-MCNC: 0.6 MG/DL (ref 0.2–1.2)
BILIRUB UR QL STRIP.AUTO: NEGATIVE
CALCIUM SERPL-MCNC: 9.1 MG/DL (ref 8.3–10.5)
CO2 SERPL-SCNC: 18 MMOL/L (ref 22–29)
COLOR UR AUTO: YELLOW
ERYTHROCYTE [DISTWIDTH] IN BLOOD BY AUTOMATED COUNT: 13.5 % (ref 11.5–14.5)
ETHANOL SERPL-MCNC: 141 MG/DL (ref ?–10)
GLUCOSE SERPL-MCNC: 183 MG/DL (ref 75–110)
GLUCOSE UR QL STRIP.AUTO: NEGATIVE MG/DL
HCT VFR BLD AUTO: 42.3 % (ref 42–52)
HGB BLD-MCNC: 14.6 G/DL (ref 13.5–18)
KETONES UR QL STRIP.AUTO: NEGATIVE
LEUKOCYTE ESTERASE UR QL STRIP: NEGATIVE
LIPASE SERPL-CCNC: 15 U/L (ref 8–78)
LYMPHOCYTES NFR BLD MANUAL: 11 % (ref 20–51)
MAGNESIUM SERPL-MCNC: 1.65 MG/DL (ref 1.6–2.6)
MCH RBC QN AUTO: 29 PG (ref 27–31)
MCHC RBC AUTO-ENTMCNC: 35 G/DL (ref 33–37)
MCV RBC AUTO: 84 FL (ref 78–100)
MONOCYTES NFR BLD: 6 % (ref 3–10)
MUCOUS THREADS URNS QL MICRO: PRESENT
NEUTS BAND NFR BLD: 25 % (ref 0–10)
NEUTS SEG NFR BLD MANUAL: 58 % (ref 42–75)
NITRITE UR QL STRIP: NEGATIVE
PH UR STRIP.AUTO: 5 [PH] (ref 5–8)
PLATELET # BLD AUTO: 288 K/MM3 (ref 130–400)
PMV BLD AUTO: 10.1 FL (ref 7.4–10.4)
POTASSIUM SERPL-SCNC: 3.6 MMOL/L (ref 3.5–5.1)
PROT ?TM UR-MCNC: (no result) MG/DL
PROT SERPL-MCNC: 7.3 G/DL (ref 6.4–8.3)
PROTHROMBIN TIME: 12.3 SECONDS (ref 9–12)
RBC # BLD AUTO: 5.04 M/MM3 (ref 4.2–5.6)
RBC UR QL AUTO: (no result)
SODIUM SERPL-SCNC: 136 MMOL/L (ref 136–145)
SP GR UR STRIP.AUTO: 1.02 (ref 1–1.03)
UROBILINOGEN UR-MCNC: NORMAL MG/DL
WBC # BLD AUTO: 18.3 K/MM3 (ref 4.8–10.8)
WBC #/AREA URNS HPF: (no result) /HPF (ref 0–3)

## 2021-05-28 PROCEDURE — C9113 INJ PANTOPRAZOLE SODIUM, VIA: HCPCS
